# Patient Record
Sex: FEMALE | Race: ASIAN
[De-identification: names, ages, dates, MRNs, and addresses within clinical notes are randomized per-mention and may not be internally consistent; named-entity substitution may affect disease eponyms.]

---

## 2019-06-14 ENCOUNTER — APPOINTMENT (OUTPATIENT)
Dept: UROLOGY | Facility: CLINIC | Age: 59
End: 2019-06-14
Payer: COMMERCIAL

## 2019-06-14 VITALS
WEIGHT: 130 LBS | RESPIRATION RATE: 17 BRPM | OXYGEN SATURATION: 98 % | TEMPERATURE: 98 F | BODY MASS INDEX: 23.92 KG/M2 | HEIGHT: 62 IN | HEART RATE: 65 BPM | DIASTOLIC BLOOD PRESSURE: 60 MMHG | SYSTOLIC BLOOD PRESSURE: 95 MMHG

## 2019-06-14 DIAGNOSIS — Z80.0 FAMILY HISTORY OF MALIGNANT NEOPLASM OF DIGESTIVE ORGANS: ICD-10-CM

## 2019-06-14 DIAGNOSIS — Z78.9 OTHER SPECIFIED HEALTH STATUS: ICD-10-CM

## 2019-06-14 PROCEDURE — 99204 OFFICE O/P NEW MOD 45 MIN: CPT

## 2019-06-14 NOTE — PHYSICAL EXAM
[Normal Appearance] : normal appearance [General Appearance - Well Nourished] : well nourished [General Appearance - Well Developed] : well developed [Edema] : no peripheral edema [Well Groomed] : well groomed [General Appearance - In No Acute Distress] : no acute distress [Exaggerated Use Of Accessory Muscles For Inspiration] : no accessory muscle use [Respiration, Rhythm And Depth] : normal respiratory rhythm and effort [Abdomen Soft] : soft [Abdomen Tenderness] : non-tender [Costovertebral Angle Tenderness] : no ~M costovertebral angle tenderness [Urinary Bladder Findings] : the bladder was normal on palpation [Normal Station and Gait] : the gait and station were normal for the patient's age [] : no rash [Oriented To Time, Place, And Person] : oriented to person, place, and time [Affect] : the affect was normal [No Focal Deficits] : no focal deficits [No Palpable Adenopathy] : no palpable adenopathy [Mood] : the mood was normal [Not Anxious] : not anxious

## 2019-06-16 NOTE — LETTER BODY
[FreeTextEntry1] : Sayra Delacruz, DO\par [address]\[place] [address] \par [phone]\par [phone]\par \par Dear Dr. Delacruz,\par \par Reason for visit: Abnormal imaging. Bilateral renal stones.\par \par This is a 58 year-old woman with abnormal abdominal imaging. Patient underwent renal ultrasound, which demonstrated possible bilateral renal stones. Patient reports of bilateral lumbar pain. Patient denies any hematuria or urinary difficulties. Patient denies any urinary incontinence. The patient denies any aggravating or relieving factors. The patient denies any interference of function. The patient is entirely asymptomatic. All other review of systems are negative. She has no cancer in her family medical history. She has no previous surgical history. Past medical history, family history and social history were inquired and were noncontributory to current condition. The patient does not use tobacco or drink alcohol. Medications and allergies were reviewed. She has no known allergies to medication. \par \par On examination, the patient is a healthy-appearing woman in no acute distress. She is alert and oriented follows commands. She  has normal mood and affect. She is normocephalic. Neck is supple. Oral no thrush. Respirations are unlabored. Abdomen is soft and nontender. Bladder is nonpalpable. No CVA tenderness. Neurologically she is grossly intact. No peripheral edema. Skin without gross abnormality.\par \par Assessment: Abnormal urinary imaging. Possible bilateral renal stones.\par \par I counseled the patient. I recommend she obtained CT scan to evaluate for bilateral renal stones. I counseled the patient regarding the procedure. The risks and benefits were discussed. I encouraged patient to maintain a low-protein low-sodium diet. I also encouraged hydration to prevent stone formation.I encouraged patient to maintain a low-protein low-sodium diet. I also encouraged hydration to prevent stone formation.  Alternatives were given. I answered the patient questions. The patient will take the necessary preparations for the procedure. The patient will follow-up as directed and will contact me with any questions or concerns. Thank you for the opportunity to participate in the care of Ms. BOSCH. I will keep you updated on her progress.\par \par Plan: CT scan. Hydration. Follow up as directed.

## 2019-06-16 NOTE — ADDENDUM
[FreeTextEntry1] : Entered by Debora Plaza, acting as scribe for Dr. Abiel Faust.\par \par The documentation recorded by the scribe accurately reflects the service I personally performed and the decisions made by me.

## 2019-06-23 LAB
APPEARANCE: ABNORMAL
BACTERIA: NEGATIVE
BILIRUBIN URINE: NEGATIVE
BLOOD URINE: NEGATIVE
CALCIUM OXALATE CRYSTALS: ABNORMAL
COLOR: YELLOW
GLUCOSE QUALITATIVE U: NEGATIVE
HYALINE CASTS: 0 /LPF
KETONES URINE: NEGATIVE
LEUKOCYTE ESTERASE URINE: NEGATIVE
MICROSCOPIC-UA: NORMAL
NITRITE URINE: NEGATIVE
PH URINE: 7
PROTEIN URINE: NORMAL
RED BLOOD CELLS URINE: 7 /HPF
SPECIFIC GRAVITY URINE: 1.02
SQUAMOUS EPITHELIAL CELLS: 1 /HPF
UROBILINOGEN URINE: NORMAL
WHITE BLOOD CELLS URINE: 4 /HPF

## 2019-06-25 ENCOUNTER — APPOINTMENT (OUTPATIENT)
Dept: UROLOGY | Facility: CLINIC | Age: 59
End: 2019-06-25
Payer: COMMERCIAL

## 2019-06-25 VITALS
BODY MASS INDEX: 23.78 KG/M2 | HEART RATE: 66 BPM | SYSTOLIC BLOOD PRESSURE: 99 MMHG | WEIGHT: 130 LBS | OXYGEN SATURATION: 96 % | TEMPERATURE: 97.8 F | RESPIRATION RATE: 17 BRPM | DIASTOLIC BLOOD PRESSURE: 63 MMHG

## 2019-06-25 PROCEDURE — 76775 US EXAM ABDO BACK WALL LIM: CPT

## 2019-06-25 PROCEDURE — 52000 CYSTOURETHROSCOPY: CPT

## 2019-06-25 RX ORDER — CIPROFLOXACIN HYDROCHLORIDE 500 MG/1
500 TABLET, FILM COATED ORAL
Qty: 2 | Refills: 0 | Status: ACTIVE | COMMUNITY
Start: 2019-06-25

## 2019-06-25 RX ORDER — CIPROFLOXACIN HYDROCHLORIDE 500 MG/1
500 TABLET, FILM COATED ORAL
Refills: 0 | Status: COMPLETED | OUTPATIENT
Start: 2019-06-25

## 2019-06-25 RX ADMIN — CIPROFLOXACIN HYDROCHLORIDE 0 MG: 500 TABLET, FILM COATED ORAL at 00:00

## 2019-06-27 LAB — URINE CYTOLOGY: NORMAL

## 2019-12-17 ENCOUNTER — APPOINTMENT (OUTPATIENT)
Dept: UROLOGY | Facility: CLINIC | Age: 59
End: 2019-12-17
Payer: COMMERCIAL

## 2019-12-17 DIAGNOSIS — N20.0 CALCULUS OF KIDNEY: ICD-10-CM

## 2019-12-17 DIAGNOSIS — Z00.00 ENCOUNTER FOR GENERAL ADULT MEDICAL EXAMINATION W/OUT ABNORMAL FINDINGS: ICD-10-CM

## 2019-12-17 DIAGNOSIS — R31.21 ASYMPTOMATIC MICROSCOPIC HEMATURIA: ICD-10-CM

## 2019-12-17 PROCEDURE — 99213 OFFICE O/P EST LOW 20 MIN: CPT | Mod: 25

## 2019-12-17 PROCEDURE — 76775 US EXAM ABDO BACK WALL LIM: CPT

## 2019-12-17 NOTE — LETTER BODY
[FreeTextEntry1] : Sayra Delacruz, DO\par [address] [facility] [address]\[facility] [place] [address] \par [phone]\par [phone]\par \par Dear Dr. Delacruz,\par \par Reason for visit: Bilateral renal stones.\par \par This is a 58 year-old woman with bilateral renal stones. She returns today for renal ultrasound. Since her last visit, patient denies any changes in health. She denies any hematuria, dysuria, or urinary difficulties. Patient denies any urinary incontinence. The patient denies any aggravating or relieving factors. The patient denies any interference of function. The patient is entirely asymptomatic. The past medical history, family history and social history are unchanged. All other review of systems are negative. Patient denies any changes in medications. Medication list was reconciled. She has no known allergies to medication. \par \par On examination, the patient is a healthy-appearing woman in no acute distress. She is alert and oriented follows commands. She has normal mood and affect. She is normocephalic. Neck is supple. Oral no thrush. Respirations are unlabored. Abdomen is soft and nontender. Bladder is nonpalpable. No CVA tenderness. Neurologically she is grossly intact. No peripheral edema. Skin without gross abnormality.\par \par I personally reviewed renal ultrasound with the patient today. Images again demonstrated nonobstructing right renal stones, and an 11.4 mm cluster of stones. There is a nonvascular right renal cyst with peripheral calcificans measuring 1.7 cm and a parapelvic nonvascular right renal cyst with calcifications measuring 1.2 cm. There were no stones visualized in the left kidney. Both kidneys appear normal in size and echogenicity without any solid masses or hydronephrosis visualized.\par \par Assessment: Right renal stones. Right renal cysts.\par \par I counseled the patient. Her renal ultrasound today again demonstrated right renal stones. I recommended the patient consider right ureteroscopy, laser lithotripsy, stone extraction, and stent placement. I counseled the patient regarding the procedure. The risks and benefits were discussed. Alternatives were given. I answered the patient questions. The patient will take the necessary preparations for the procedure. The patient will follow-up as directed and will contact me with any questions or concerns. Thank you for the opportunity to participate in the care of Ms. BOSCH. I will keep you updated on her progress.\par \par Plan: Right ureteroscopy and lithotripsy. Follow up as directed.

## 2019-12-17 NOTE — ADDENDUM
[FreeTextEntry1] : Entered by Benjamin Love, acting as scribe for Dr. Abiel Faust.\par \par The documentation recorded by the scribe accurately reflects the service I personally performed and the decisions made by me.

## 2019-12-18 ENCOUNTER — CLINICAL ADVICE (OUTPATIENT)
Age: 59
End: 2019-12-18

## 2019-12-18 PROBLEM — R31.21 ASYMPTOMATIC MICROSCOPIC HEMATURIA: Status: ACTIVE | Noted: 2019-06-19

## 2019-12-18 PROBLEM — Z00.00 ENCOUNTER FOR PREVENTIVE HEALTH EXAMINATION: Status: ACTIVE | Noted: 2019-06-13

## 2019-12-18 PROBLEM — N20.0 BILATERAL KIDNEY STONES: Status: ACTIVE | Noted: 2019-06-14

## 2020-01-22 ENCOUNTER — APPOINTMENT (OUTPATIENT)
Dept: UROLOGY | Facility: HOSPITAL | Age: 60
End: 2020-01-22